# Patient Record
Sex: MALE | Race: ASIAN
[De-identification: names, ages, dates, MRNs, and addresses within clinical notes are randomized per-mention and may not be internally consistent; named-entity substitution may affect disease eponyms.]

---

## 2020-02-03 ENCOUNTER — HOSPITAL ENCOUNTER (EMERGENCY)
Dept: HOSPITAL 62 - ER | Age: 30
Discharge: HOME | End: 2020-02-03
Payer: COMMERCIAL

## 2020-02-03 VITALS — DIASTOLIC BLOOD PRESSURE: 52 MMHG | SYSTOLIC BLOOD PRESSURE: 101 MMHG

## 2020-02-03 DIAGNOSIS — R30.0: Primary | ICD-10-CM

## 2020-02-03 DIAGNOSIS — R05: ICD-10-CM

## 2020-02-03 DIAGNOSIS — R50.9: ICD-10-CM

## 2020-02-03 DIAGNOSIS — M54.5: ICD-10-CM

## 2020-02-03 LAB
ADD MANUAL DIFF: NO
ALBUMIN SERPL-MCNC: 4.9 G/DL (ref 3.5–5)
ALP SERPL-CCNC: 58 U/L (ref 38–126)
ANION GAP SERPL CALC-SCNC: 13 MMOL/L (ref 5–19)
APPEARANCE UR: CLEAR
APTT PPP: YELLOW S
AST SERPL-CCNC: 27 U/L (ref 17–59)
BASOPHILS # BLD AUTO: 0.1 10^3/UL (ref 0–0.2)
BASOPHILS NFR BLD AUTO: 0.6 % (ref 0–2)
BILIRUB DIRECT SERPL-MCNC: 0.3 MG/DL (ref 0–0.4)
BILIRUB SERPL-MCNC: 0.6 MG/DL (ref 0.2–1.3)
BILIRUB UR QL STRIP: NEGATIVE
BUN SERPL-MCNC: 14 MG/DL (ref 7–20)
CALCIUM: 9.5 MG/DL (ref 8.4–10.2)
CHLORIDE SERPL-SCNC: 96 MMOL/L (ref 98–107)
CO2 SERPL-SCNC: 27 MMOL/L (ref 22–30)
EOSINOPHIL # BLD AUTO: 0 10^3/UL (ref 0–0.6)
EOSINOPHIL NFR BLD AUTO: 0.1 % (ref 0–6)
ERYTHROCYTE [DISTWIDTH] IN BLOOD BY AUTOMATED COUNT: 13 % (ref 11.5–14)
GLUCOSE SERPL-MCNC: 99 MG/DL (ref 75–110)
GLUCOSE UR STRIP-MCNC: NEGATIVE MG/DL
HCT VFR BLD CALC: 47.1 % (ref 37.9–51)
HGB BLD-MCNC: 16.1 G/DL (ref 13.5–17)
INR PPP: 1.18
KETONES UR STRIP-MCNC: 20 MG/DL
LYMPHOCYTES # BLD AUTO: 0.8 10^3/UL (ref 0.5–4.7)
LYMPHOCYTES NFR BLD AUTO: 8 % (ref 13–45)
MCH RBC QN AUTO: 29.8 PG (ref 27–33.4)
MCHC RBC AUTO-ENTMCNC: 34.2 G/DL (ref 32–36)
MCV RBC AUTO: 87 FL (ref 80–97)
MONOCYTES # BLD AUTO: 1 10^3/UL (ref 0.1–1.4)
MONOCYTES NFR BLD AUTO: 10.4 % (ref 3–13)
NEUTROPHILS # BLD AUTO: 7.8 10^3/UL (ref 1.7–8.2)
NEUTS SEG NFR BLD AUTO: 80.9 % (ref 42–78)
PH UR STRIP: 6 [PH] (ref 5–9)
PLATELET # BLD: 184 10^3/UL (ref 150–450)
POTASSIUM SERPL-SCNC: 4.2 MMOL/L (ref 3.6–5)
PROT SERPL-MCNC: 8.5 G/DL (ref 6.3–8.2)
PROT UR STRIP-MCNC: 30 MG/DL
PROTHROMBIN TIME: 15.1 SEC (ref 11.4–15.4)
RBC # BLD AUTO: 5.41 10^6/UL (ref 4.35–5.55)
SP GR UR STRIP: 1.02
TOTAL CELLS COUNTED % (AUTO): 100 %
UROBILINOGEN UR-MCNC: 4 MG/DL (ref ?–2)
WBC # BLD AUTO: 9.6 10^3/UL (ref 4–10.5)

## 2020-02-03 PROCEDURE — 80053 COMPREHEN METABOLIC PANEL: CPT

## 2020-02-03 PROCEDURE — 99284 EMERGENCY DEPT VISIT MOD MDM: CPT

## 2020-02-03 PROCEDURE — 85610 PROTHROMBIN TIME: CPT

## 2020-02-03 PROCEDURE — 83605 ASSAY OF LACTIC ACID: CPT

## 2020-02-03 PROCEDURE — 93005 ELECTROCARDIOGRAM TRACING: CPT

## 2020-02-03 PROCEDURE — 71046 X-RAY EXAM CHEST 2 VIEWS: CPT

## 2020-02-03 PROCEDURE — 74176 CT ABD & PELVIS W/O CONTRAST: CPT

## 2020-02-03 PROCEDURE — 81001 URINALYSIS AUTO W/SCOPE: CPT

## 2020-02-03 PROCEDURE — 36415 COLL VENOUS BLD VENIPUNCTURE: CPT

## 2020-02-03 PROCEDURE — 96365 THER/PROPH/DIAG IV INF INIT: CPT

## 2020-02-03 PROCEDURE — 87040 BLOOD CULTURE FOR BACTERIA: CPT

## 2020-02-03 PROCEDURE — 85025 COMPLETE CBC W/AUTO DIFF WBC: CPT

## 2020-02-03 PROCEDURE — 93010 ELECTROCARDIOGRAM REPORT: CPT

## 2020-02-03 NOTE — RADIOLOGY REPORT (SQ)
EXAM DESCRIPTION:  CHEST 2 VIEWS



COMPLETED DATE/TIME:  2/3/2020 5:15 pm



REASON FOR STUDY:  cough fever



COMPARISON:  None.



EXAM PARAMETERS:  NUMBER OF VIEWS: two views

TECHNIQUE: Digital Frontal and Lateral radiographic views of the chest acquired.

RADIATION DOSE: NA

LIMITATIONS: none



FINDINGS:  LUNGS AND PLEURA: No opacities, masses or pneumothorax. No pleural effusion.

MEDIASTINUM AND HILAR STRUCTURES: No masses or contour abnormalities.

HEART AND VASCULAR STRUCTURES: Heart normal size.  No evidence for failure.

BONES: No acute findings.

HARDWARE: None in the chest.

OTHER: No other significant finding.



IMPRESSION:  NO ACUTE RADIOGRAPHIC FINDING IN THE CHEST.



TECHNICAL DOCUMENTATION:  JOB ID:  [id number]

 2011 [employer]- All Rights Reserved



Reading location - IP/workstation name: [facility]

## 2020-02-03 NOTE — RADIOLOGY REPORT (SQ)
EXAM DESCRIPTION:  CT ABD/PELVIS NO ORAL OR IV



COMPLETED DATE/TIME:  2/3/2020 6:16 pm



REASON FOR STUDY:  right flank pain dysuria



COMPARISON:  None.



TECHNIQUE:  CT scan of the abdomen and pelvis performed without intravenous or oral contrast. Images 
reviewed with lung, soft tissue, and bone windows. Reconstructed coronal and sagittal MPR images revi
ewed. All images stored on PACS.

All CT scanners at this facility use dose modulation, iterative reconstruction, and/or weight based d
osing when appropriate to reduce radiation dose to as low as reasonably achievable (ALARA).

[facility]: Dose Right  [facility]: CareDose    [facility]: Dose Right    [facility]: Teradose 4D    [facility]: Smart [employer]



RADIATION DOSE:  CT Rad equipment meets quality standard of care and radiation dose reduction techniq
ues were employed. CTDIvol: 5.2 mGy. DLP: 299 mGy-cm.mGy.



LIMITATIONS:  None.



FINDINGS:  LOWER CHEST: No significant findings. No nodules or infiltrates.

NON-CONTRASTED LIVER, SPLEEN, ADRENALS: Evaluation limited by lack of IV contrast. No identified sign
ificant masses.

PANCREAS: No masses. No peripancreatic inflammatory changes.

GALLBLADDER: No identified stones by CT criteria. No inflammatory changes to suggest cholecystitis.

RIGHT KIDNEY AND URETER: No suspicious masses. Assessment limited by lack of IV contrast.   No signif
icant calcifications.   No hydronephrosis or hydroureter.

LEFT KIDNEY AND URETER: No suspicious masses. Assessment limited by lack of IV contrast.   No signifi
cant calcifications.   No hydronephrosis or hydroureter.

AORTA AND RETROPERITONEUM: No aneurysm. No retroperitoneal masses or adenopathy.

BOWEL AND PERITONEAL CAVITY: No obvious masses or inflammatory changes. No free fluid.

APPENDIX: Normal.

PELVIS, BLADDER, AND ABDOMINAL WALL:No abnormal masses. No free fluid. Bladder normal.

BONES: No significant findings.

OTHER:  Findings discussed with Marla POON in the Rmergency Room.



IMPRESSION:  NO SIGNIFICANT OR ACUTE PROCESS IN THE ABDOMEN OR PELVIS.



COMMENT:  Quality ID # [id number]: Final reports with documentation of one or more dose reduction techniques
 (e.g., Automated exposure control, adjustment of the mA and/or kV according to patient size, use of 
iterative reconstruction technique)



TECHNICAL DOCUMENTATION:  JOB ID:  [id number]

 2011 [employer]- All Rights Reserved



Reading location - IP/workstation name: [facility]

## 2020-02-03 NOTE — ER DOCUMENT REPORT
ED Flu Like





- General


Chief Complaint: Fever


Stated Complaint: BACK PAIN, FEVER


Time Seen by Provider: 02/03/20 16:50


Mode of Arrival: Ambulatory





- Related Data


Allergies/Adverse Reactions: 


                                        





No Known Allergies Allergy (Verified 02/03/20 16:51)


   











Physical Exam





- Vital signs


Vitals: 





                                        











Temp Pulse Resp BP Pulse Ox


 


 102.5 F H  64   20   129/68 H  99 


 


 02/03/20 16:38  02/03/20 16:38  02/03/20 16:38  02/03/20 16:38  02/03/20 16:38














Course





- Vital Signs


Vital signs: 





                                        











Temp Pulse Resp BP Pulse Ox


 


 102.5 F H  64   20   129/68 H  99 


 


 02/03/20 16:38  02/03/20 16:38  02/03/20 16:38  02/03/20 16:38  02/03/20 16:38

## 2020-02-03 NOTE — ER DOCUMENT REPORT
ED Fever





- General


Chief Complaint: Fever


Stated Complaint: BACK PAIN, FEVER


Time Seen by Provider: 02/03/20 16:50


Mode of Arrival: Ambulatory


Information source: Patient


TRAVEL OUTSIDE OF THE [place] IN LAST 30 DAYS: No





- HPI


Notes: 





Patient presents with low back pain and fever.  He also states that he has 

dysuria stating that it burns when he pees.  He has had the symptoms for 2 to 3 

days.  Nothing makes them better or worse.  The pain does radiate across both 

lower sides of his back.  It is worse with movement and better with rest.  He 

has had this similar symptoms in the past he states he said this was when he was

in the [facility].  He states he was diagnosed with a urinary tract infection 

and took antibiotics.  He states the pain went away.  He denies any known 

history of any chronic medical conditions.  He has had some nausea and vomiting.

 No problems with stool.  He has had fever and chills.  His symptoms been mild 

to moderate.  They have been mostly constant.  He denies abdominal pain.





- Related Data


Allergies/Adverse Reactions: 


                                        





No Known Allergies Allergy (Verified 02/03/20 16:51)


   











Past Medical History





- General


Information source: Patient





- Social History


Smoking Status: Current Every Day Smoker


Frequency of alcohol use: Rare


Drug Abuse: None


Family History: Reviewed & Not Pertinent


Patient has suicidal ideation: No


Patient has homicidal ideation: No





Review of Systems





- Review of Systems


Constitutional: Chills, Fever, Malaise


Cardiovascular: denies: Chest pain, Palpitations


Respiratory: denies: Cough, Short of breath


Gastrointestinal: denies: Abdominal pain, Diarrhea


-: Yes All other systems reviewed and negative





Physical Exam





- Vital signs


Vitals: 





                                        











Temp Pulse Resp BP Pulse Ox


 


 102.5 F H  64   20   129/68 H  99 


 


 02/03/20 16:38  02/03/20 16:38  02/03/20 16:38  02/03/20 16:38  02/03/20 16:38











Interpretation: Tachycardic, Febrile





- General


General appearance: Appears well, Alert





- HEENT


Head: Normocephalic, Atraumatic


Eyes: Normal


Pupils: PERRL





- Respiratory


Respiratory status: No respiratory distress


Chest status: Nontender


Breath sounds: Normal


Chest palpation: Normal





- Cardiovascular


Rhythm: Tachycardia


Heart sounds: Normal auscultation


Murmur: No





- Abdominal


Inspection: Normal


Distension: No distension


Bowel sounds: Normal


Tenderness: Nontender


Organomegaly: No organomegaly





- Back


Back: Tender - Patient has some very mild tenderness palpation of the left 

lateral spinal area in the lumbar region.  The lumbar spine itself is not 

tender.





- Extremities


General upper extremity: Normal inspection, Nontender, Normal color, Normal ROM,

Normal temperature


General lower extremity: Normal inspection, Nontender, Normal color, Normal ROM,

Normal temperature, Normal weight bearing.  No: Varun's sign





- Neurological


Neuro grossly intact: Yes


Cognition: Normal


Orientation: AAOx4


Brie Coma Scale Eye Opening: Spontaneous


Brie Coma Scale Verbal: Oriented


Brie Coma Scale Motor: Obeys Commands


[place] Coma Scale Total: 15


Speech: Normal


Motor strength normal: LUE, RUE, LLE, RLE


Sensory: Normal





- Psychological


Associated symptoms: Normal affect, Normal mood





- Skin


Skin Temperature: Warm


Skin Moisture: Dry


Skin Color: Normal





Course





- Re-evaluation


Re-evalutation: 





02/03/20 19:16


Patient presents with fever and some low back pain.  He is not tender in the 

midline and it is more paraspinal.  He denies IV drug use and I see no risk 

factors for a spinal or epidural abscess.  Patient did have a CT scan of the 

abdomen which shows no significant abnormalities including in the bone windows 

of the lumbar spine.  He does have an equivocal urine with dysuria and a 

previous history of urinary tract infection.  I will try him on some antibiotics

to cover this as well as recommend fluids Tylenol Motrin and will have him 

reassessed in 24 to 48 hours.  This could also obviously be a viral infection 

but at this time I think the most prudent thing to do is have the patient 

reexamined in 24 to 48 hours with antibiotics.





- Vital Signs


Vital signs: 





                                        











Temp Pulse Resp BP Pulse Ox


 


 102.5 F H  64   24 H  133/70 H  96 


 


 02/03/20 16:38  02/03/20 16:38  02/03/20 18:24  02/03/20 18:24  02/03/20 18:31














- Laboratory


Result Diagrams: 


                                 02/03/20 17:45





                                 02/03/20 17:45


Laboratory results interpreted by me: 





                                        











  02/03/20 02/03/20 02/03/20





  17:45 17:45 17:45


 


Lymph % (Auto)  8.0 L  


 


Seg Neutrophils %  80.9 H  


 


Sodium   136.1 L 


 


Chloride   96 L 


 


Total Protein   8.5 H 


 


Urine Protein    30 H


 


Urine Ketones    20 H


 


Urine Blood    SMALL H


 


Urine Urobilinogen    4.0 H














- Diagnostic Test


Radiology reviewed: Image reviewed, Reports reviewed





Discharge





- Discharge


Clinical Impression: 


 Dysuria





Fever


Qualifiers:


 Fever type: unspecified Qualified Code(s): R50.9 - Fever, unspecified





Low back pain


Qualifiers:


 Chronicity: acute Back pain laterality: bilateral Sciatica presence: without 

sciatica Qualified Code(s): M54.5 - Low back pain





Condition: Stable


Disposition: HOME, SELF-CARE


Instructions:  Fever ([facility])


Additional Instructions: 


Please follow-up with your primary care doctor in 24 to 40 hours.  If you are 

not able to follow-up with your primary doctor in 48 hours and are still having 

fever, pain, vomiting or other symptoms please return to the emergency 

department for an evaluation.


Prescriptions: 


Sulfamethoxazole/Trimethoprim [Bactrim Ds Tablet] 1 each PO BID 7 Days #14 

tablet


Forms:  Return to Work


Referrals: 


BONNIE CHANG MD [NO LOCAL MD] - Follow up in 1 week

## 2020-02-03 NOTE — ER DOCUMENT REPORT
ED Medical Screen (RME)





- General


Chief Complaint: Fever


Stated Complaint: BACK PAIN, FEVER


Time Seen by Provider: 02/03/20 16:50


Primary Care Provider: 


BONNIE CHANG MD [NO LOCAL MD] - Follow up in 1 week


Mode of Arrival: Ambulatory


Information source: Patient


Notes: 





29-year-old male presented to ED for complaint of cough congestion fever low 

back pain since yesterday.  I did complete CBC chemistry urine and CT them and 

pelvis for his body aches fever cough congestion dysuria and pain with 

urination.  Patient was alert oriented respirations regular nonlabored speaking 

in full sentences.











I have greeted and performed a rapid initial assessment of this patient.  A 

comprehensive ED assessment and evaluation of the patient, analysis of test 

results and completion of medical decision making process will be conducted by 

an additional ED providers.


TRAVEL OUTSIDE OF THE [place] IN LAST 30 DAYS: No





- Related Data


Allergies/Adverse Reactions: 


                                        





No Known Allergies Allergy (Verified 02/03/20 16:51)


   











Past Medical History





- Social History


Frequency of alcohol use: Rare


Drug Abuse: None





Physical Exam





- Vital signs


Vitals: 





                                        











Temp Pulse Resp BP Pulse Ox


 


 102.5 F H  64   20   129/68 H  99 


 


 02/03/20 16:38  02/03/20 16:38  02/03/20 16:38  02/03/20 16:38  02/03/20 16:38














Course





- Vital Signs


Vital signs: 





                                        











Temp Pulse Resp BP Pulse Ox


 


 100.7 F H  64   19   101/52 L  97 


 


 02/03/20 19:32  02/03/20 16:38  02/03/20 19:29  02/03/20 19:29  02/03/20 19:29














- Laboratory


Result Diagrams: 


                                 02/03/20 17:45





                                 02/03/20 17:45


Laboratory results interpreted by me: 





                                        











  02/03/20 02/03/20 02/03/20





  17:45 17:45 17:45


 


Lymph % (Auto)  8.0 L  


 


Seg Neutrophils %  80.9 H  


 


Sodium   136.1 L 


 


Chloride   96 L 


 


Total Protein   8.5 H 


 


Urine Protein    30 H


 


Urine Ketones    20 H


 


Urine Blood    SMALL H


 


Urine Urobilinogen    4.0 H














Doctor's Discharge





- Discharge


Clinical Impression: 


 Dysuria





Fever


Qualifiers:


 Fever type: unspecified Qualified Code(s): R50.9 - Fever, unspecified





Low back pain


Qualifiers:


 Chronicity: acute Back pain laterality: bilateral Sciatica presence: without 

sciatica Qualified Code(s): M54.5 - Low back pain





Condition: Stable


Disposition: HOME, SELF-CARE


Instructions:  Fever ([facility])


Additional Instructions: 


Please follow-up with your primary care doctor in 24 to 40 hours.  If you are 

not able to follow-up with your primary doctor in 48 hours and are still having 

fever, pain, vomiting or other symptoms please return to the emergency 

department for an evaluation.


Prescriptions: 


Sulfamethoxazole/Trimethoprim [Bactrim Ds Tablet] 1 each PO BID 7 Days #14 

tablet


Forms:  Return to Work


Referrals: 


BONNIE CHANG MD [NO LOCAL MD] - Follow up in 1 week